# Patient Record
(demographics unavailable — no encounter records)

---

## 2024-12-10 NOTE — DISCUSSION/SUMMARY
[FreeTextEntry1] : stable exam CAD stable, non obstructive, continue risk modification carotid - stable asymptomatic HTN stable on meds Lipids stable on statin [EKG obtained to assist in diagnosis and management of assessed problem(s)] : EKG obtained to assist in diagnosis and management of assessed problem(s)

## 2025-06-10 NOTE — DISCUSSION/SUMMARY
[FreeTextEntry1] : stable exam CAD stable, non obstructive, continue risk modification HTN stable on meds Lipids stable on statin [EKG obtained to assist in diagnosis and management of assessed problem(s)] : EKG obtained to assist in diagnosis and management of assessed problem(s)